# Patient Record
Sex: MALE | Race: WHITE | Employment: FULL TIME | URBAN - METROPOLITAN AREA
[De-identification: names, ages, dates, MRNs, and addresses within clinical notes are randomized per-mention and may not be internally consistent; named-entity substitution may affect disease eponyms.]

---

## 2023-02-26 ENCOUNTER — HOSPITAL ENCOUNTER (EMERGENCY)
Age: 53
Discharge: HOME | End: 2023-02-27
Attending: STUDENT IN AN ORGANIZED HEALTH CARE EDUCATION/TRAINING PROGRAM

## 2023-02-26 DIAGNOSIS — B02.9 HERPES ZOSTER WITHOUT COMPLICATION: Primary | ICD-10-CM

## 2023-02-26 PROCEDURE — 99283 EMERGENCY DEPT VISIT LOW MDM: CPT

## 2023-02-27 VITALS
OXYGEN SATURATION: 95 % | RESPIRATION RATE: 18 BRPM | SYSTOLIC BLOOD PRESSURE: 179 MMHG | DIASTOLIC BLOOD PRESSURE: 99 MMHG | HEART RATE: 79 BPM

## 2023-02-27 PROCEDURE — 74011250637 HC RX REV CODE- 250/637: Performed by: STUDENT IN AN ORGANIZED HEALTH CARE EDUCATION/TRAINING PROGRAM

## 2023-02-27 RX ORDER — GABAPENTIN 300 MG/1
300 CAPSULE ORAL ONCE
Status: COMPLETED | OUTPATIENT
Start: 2023-02-27 | End: 2023-02-27

## 2023-02-27 RX ORDER — ACETAMINOPHEN 325 MG/1
650 TABLET ORAL
Status: COMPLETED | OUTPATIENT
Start: 2023-02-27 | End: 2023-02-27

## 2023-02-27 RX ORDER — ACETAMINOPHEN 325 MG/1
650 TABLET ORAL
Qty: 20 TABLET | Refills: 0 | Status: SHIPPED | OUTPATIENT
Start: 2023-02-27

## 2023-02-27 RX ORDER — OXYCODONE AND ACETAMINOPHEN 5; 325 MG/1; MG/1
1 TABLET ORAL ONCE
Status: COMPLETED | OUTPATIENT
Start: 2023-02-27 | End: 2023-02-27

## 2023-02-27 RX ORDER — LIDOCAINE 50 MG/G
1 PATCH TOPICAL EVERY 24 HOURS
Qty: 5 PATCH | Refills: 0 | Status: SHIPPED | OUTPATIENT
Start: 2023-02-27 | End: 2023-03-04

## 2023-02-27 RX ORDER — OXYCODONE AND ACETAMINOPHEN 5; 325 MG/1; MG/1
1 TABLET ORAL
Qty: 12 TABLET | Refills: 0 | Status: SHIPPED | OUTPATIENT
Start: 2023-02-27 | End: 2023-03-02

## 2023-02-27 RX ORDER — VALACYCLOVIR HYDROCHLORIDE 1 G/1
1000 TABLET, FILM COATED ORAL 3 TIMES DAILY
Qty: 21 TABLET | Refills: 0 | Status: SHIPPED | OUTPATIENT
Start: 2023-02-27 | End: 2023-03-06

## 2023-02-27 RX ORDER — DIPHENHYDRAMINE HCL 25 MG
25 TABLET ORAL
Qty: 30 TABLET | Refills: 0 | Status: SHIPPED | OUTPATIENT
Start: 2023-02-27

## 2023-02-27 RX ORDER — GABAPENTIN 300 MG/1
300 CAPSULE ORAL 3 TIMES DAILY
Qty: 42 CAPSULE | Refills: 0 | Status: SHIPPED | OUTPATIENT
Start: 2023-02-27 | End: 2023-03-13

## 2023-02-27 RX ADMIN — GABAPENTIN 300 MG: 300 CAPSULE ORAL at 01:04

## 2023-02-27 RX ADMIN — OXYCODONE AND ACETAMINOPHEN 1 TABLET: 5; 325 TABLET ORAL at 01:04

## 2023-02-27 RX ADMIN — ACETAMINOPHEN 650 MG: 325 TABLET ORAL at 01:04

## 2023-02-27 NOTE — ED PROVIDER NOTES
Chief Complaint   Patient presents with    Rash       INITIAL ASSESSMENT & PLAN   12:34 AM  Differential diagnosis includes but is not limited to herpes zoster, smallpox, cellulitis, abscess, SJS, allergic reaction    Patient with obvious herpes zoster rash along the dermatome described as burning. Given his risk factors, I would actually like to obtain blood work. However, he states that he cannot obtain blood work because of his Raynaud's. This is not actually a contraindication for such. However, he continues to decline. Given this, will not obtain bladder. First dose medications were given here including analgesia. We will also prescribe additional Valtrex as well as gabapentin. Valtrex however is renally dosed. Did advise him that because renally dose he needs to get his kidney numbers rechecked as he has refused here. Otherwise afebriel pain improved    I have obtained additional independent history from:   I have personally reviewed patient's [facility] ED external prior records from:  --[employer] summarizing: n/a. PDMP however shows no rx for narcotics      HISTORY OF PRESENT ILLNESS   Additional independent historian:      Rash   62-year-old male apparently history of lupus, Raynaud's, multiple sclerosis, gout, currently only on prednisone, has had shingles 3 times before, presenting for chief complaint of shingles pain. He states that he already has Lidoderm and is requesting Valtrex as well as something for pain. He states that he started having pain 3 days ago with a rash started today. It is mostly right-sided, under his right breast.  Does state that it is only on his skin. Denies any abdominal pain. No fevers. States he is visiting out of [place]. REVIEW OF SYSTEMS  Review of Systems   Skin:  Positive for rash.    I performed a 10-point review of systems which have been otherwise included in the HPI as documented, otherwise all other systems have been reviewed and are negative. MEDICAL HISTORY  No past medical history on file. lupus, raynauid's, htn, MS, gout  No past surgical history on file. No family history on file. ALLERGIES: Patient has no known allergies. Medications  No current facility-administered medications on file prior to encounter. No current outpatient medications on file prior to encounter. prednisone    PHYSICAL EXAM     Vitals:    02/26/23 2302 02/27/23 0105   BP: (!) 160/93 (!) 179/99   Pulse: 82 79   Resp: 18    SpO2: 95%      Physical Exam  Vitals and nursing note reviewed. Constitutional:       General: He is not in acute distress. Appearance: Normal appearance. He is not ill-appearing. Comments: Markedly elevated bmi   HENT:      Head: Normocephalic and atraumatic. Eyes:      Extraocular Movements: Extraocular movements intact. Conjunctiva/sclera: Conjunctivae normal.   Cardiovascular:      Rate and Rhythm: Normal rate and regular rhythm. Pulses: Normal pulses. Heart sounds: Normal heart sounds. No murmur heard. No friction rub. No gallop. Pulmonary:      Effort: Pulmonary effort is normal. No respiratory distress. Breath sounds: Normal breath sounds. No wheezing or rales. Abdominal:      General: There is no distension. Palpations: Abdomen is soft. Tenderness: There is no abdominal tenderness. Musculoskeletal:         General: No swelling or deformity. Normal range of motion. Skin:     General: Skin is warm and dry. Capillary Refill: Capillary refill takes less than 2 seconds. Findings: Rash (also some minimal blistering of different ages) present. Comments: Erythematous rash along single dermatome right lower breast    Neurological:      General: No focal deficit present. Mental Status: He is alert. Mental status is at baseline. Motor: No weakness. DIAGNOSTIC STUDIES   Laboratory Results:   If not already interpreted as below in ED course, I have personally ordered, reviewed, and independently interpreted all laboratory results, notable for:  --  No results found for this or any previous visit (from the past 24 hour(s)). Imaging Results: If not already interpreted as below in ED course, I have personally ordered, reviewed, and interpreted the following radiology results:    No orders to display       ED COURSE        PROCEDURES  Procedures    Medications Ordered/Administered in ED  Medications   oxyCODONE-acetaminophen (PERCOCET) 5-325 mg per tablet 1 Tablet (1 Tablet Oral Given 2/27/23 0104)   acetaminophen (TYLENOL) tablet 650 mg (650 mg Oral Given 2/27/23 0104)   gabapentin (NEURONTIN) capsule 300 mg (300 mg Oral Given 2/27/23 0104)       ADDITIONAL CONSIDERATIONS   I opted against but considered ordering the following:  --cbc -- refused  Chemistry for renal function -- refused  Ua for eval for renal issues -- refused  Additional relevant contributory comorbidities managed:  HTN -- pt found to be markedly hypertensive on initial check. Repeat BP showed the same. Discussed this with the patient as well and counseled regarding HTN as a risk factor for multiple pathologies. Informed of need for re-check with primary care physician. BMI -- pt noted to have increased BMI. Advised that this is certainly a risk factor for multiple emergent pathologies. Counseled briefly regarding attempt at reduction and follow up with PCP. Social Determinants of Health addressed:  Difficulty with medications -- first dose here  Transportation -- Initially told me he had transprot, then when came to [place] told RN he had no transport  FINAL ASSESSMENT AND DISPOSITION     ED DIAGNOSIS  1.  Herpes zoster without complication        DISPOSITION  dc    Labs/Imaging Studies Ordered/Performed in ED  Orders Placed This Encounter    oxyCODONE-acetaminophen (PERCOCET) 5-325 mg per tablet 1 Tablet    acetaminophen (TYLENOL) tablet 650 mg    gabapentin (NEURONTIN) capsule 300 mg valACYclovir (VALTREX) 1 gram tablet    acetaminophen (TYLENOL) 325 mg tablet    oxyCODONE-acetaminophen (Percocet) 5-325 mg per tablet    lidocaine (Lidoderm) 5 %    gabapentin (NEURONTIN) 300 mg capsule    diphenhydrAMINE (BENADRYL) 25 mg tablet       Electronically signed by

## 2023-02-27 NOTE — DISCHARGE INSTRUCTIONS
MANY OF YOUR MEDICATIONS ARE DOSED BASED OFF OF KIDNEY FUNCTION  BECAUSE WE DID NOT BLOODWORK THEY WERE DOSED ASSUMING YOU HAVE A RELATIVELY NORMAL KIDNEY FUNCTION  BE SURE TO DRINK PLENTY OF WATER  FOLLOW UP WITH YOUR PRIMARY CARE PHYSICIAN FOR RECHECK
Skin normal color for race, warm, dry and intact. No evidence of rash.